# Patient Record
Sex: MALE | Race: WHITE | NOT HISPANIC OR LATINO | Employment: OTHER | URBAN - METROPOLITAN AREA
[De-identification: names, ages, dates, MRNs, and addresses within clinical notes are randomized per-mention and may not be internally consistent; named-entity substitution may affect disease eponyms.]

---

## 2022-07-08 NOTE — PATIENT DISCUSSION
"12/27/21: PATIENT HAD EXPERIENCED EPISODES THAT RESEMBLED POSSIBLE TIA SYMPTOMS- OVER THE LAST YEAR OR SO. (FLASHES IN VISION, DIZZINESS, DISORIENTATION. ) - SHE HAS BEEN DIAGNOSED WITH ASTHMA AND AFIB SINCE HER LAST VISIT IN 08/2021. SINCE THEN, SHE STATES SHE IS FEELING A LITTLE MORE ""EVEN AND NORMAL"". "

## 2022-07-08 NOTE — PATIENT DISCUSSION
RETAINE MGD - 1 DROP TO EACH EYE 2-3 TIMES A DAY, USE ESPECIALLY BEFORE READING. [CAN USE 1 VIAL FOR UP TO 2 DAYS THEN DISCARD] - RESTASIS - 1 DROP TO EACH EYE 1-2 TIMES A DAY. .  *** PATIENT WOULD LIKE TO STAY WITH SYSTANE DROPS ***.

## 2023-01-10 NOTE — PATIENT DISCUSSION
1/10/23: PATIENT HAD EXPERIENCED EPISODES THAT RESEMBLED POSSIBLE TIA SYMPTOMS- OVER THE LAST YEAR OR SO. (FLASHES IN VISION, DIZZINESS, DISORIENTATION. ) - SHE HAS BEEN DIAGNOSED WITH ASTHMA AND AFIB SINCE HER LAST VISIT IN 08/2021. SINCE THEN, PT STATES SHE HAS BEEN TO THE HOSPITAL SEVERAL TIMES FOR THE AFIB.

## 2023-01-10 NOTE — PATIENT DISCUSSION
PT STATES [facility] IS NOT AS GOOD AS IT USED TO BE. WILL HAVE PT COME BACK TO RECHECK [facility] WITH OD. IF NO IMPROVEMENT OD TO SEND PT TO RETINAL SPECIALIST FOR FURTHER EVALUATION.

## 2024-10-30 ENCOUNTER — NEW PATIENT (OUTPATIENT)
Facility: LOCATION | Age: 79
End: 2024-10-30

## 2024-10-30 DIAGNOSIS — H25.13: ICD-10-CM

## 2024-10-30 DIAGNOSIS — H40.019: ICD-10-CM

## 2024-10-30 PROCEDURE — 92004 COMPRE OPH EXAM NEW PT 1/>: CPT

## 2024-10-30 PROCEDURE — 92133 CPTRZD OPH DX IMG PST SGM ON: CPT

## 2024-10-30 PROCEDURE — 92015 DETERMINE REFRACTIVE STATE: CPT
